# Patient Record
Sex: FEMALE | Race: WHITE | NOT HISPANIC OR LATINO | URBAN - NONMETROPOLITAN AREA
[De-identification: names, ages, dates, MRNs, and addresses within clinical notes are randomized per-mention and may not be internally consistent; named-entity substitution may affect disease eponyms.]

---

## 2019-02-20 ENCOUNTER — IMPORTED ENCOUNTER (OUTPATIENT)
Dept: URBAN - NONMETROPOLITAN AREA CLINIC 1 | Facility: CLINIC | Age: 69
End: 2019-02-20

## 2019-02-20 PROCEDURE — 92015 DETERMINE REFRACTIVE STATE: CPT

## 2019-02-20 PROCEDURE — 92014 COMPRE OPH EXAM EST PT 1/>: CPT

## 2019-02-20 NOTE — PATIENT DISCUSSION
Hyperopia / Astigmatism / Presbyopia OU- Discussed diagnosis in detail with patient- New glasses Rx given - Continue to monitorDES OU-  Discussed findings in detail w/ pt today-  Signs/symptoms associated dicussed-  Advised to continue ATs PRN-  Patient desired trial of Restasis in the past but did not use. Continues with Refresh OU BID PRN. -  Monitor PRN. Cataracts OU mild:- Discussed diagnosis in detail today- BAT done today was 20/400 and 20/200. - Mild VA complaint noted by the patient today- Defers any surgical intervention at this time. - Will continue to monitor for nowPVD OU- Discussed findings of exam in detail with the patient. - The risk of retinal detachment in patients with PVDs was discussed with the patient and the warning signs of retinal detachment were carefully reviewed with the patient. - The patient was warned to return to the office or contact the ophthalmologist on call immediately if they experience signs of retinal detachment or changes in vision noted from today. -  Continue to monitor. 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  - Patient is stable at this time - Continue to monitorDermatochalasis OU- Discussed diagnosis in detail with patient- No superior field of vision loss at this time- Continue to monitor; 's Notes: RM  10/24/16DFE  10/16/15OCT 10/16/15OPtos 10/24/16 - $29

## 2020-02-21 ENCOUNTER — IMPORTED ENCOUNTER (OUTPATIENT)
Dept: URBAN - NONMETROPOLITAN AREA CLINIC 1 | Facility: CLINIC | Age: 70
End: 2020-02-21

## 2020-02-21 PROBLEM — H16.223: Noted: 2020-02-21

## 2020-02-21 PROBLEM — H52.4: Noted: 2020-02-21

## 2020-02-21 PROCEDURE — 92015 DETERMINE REFRACTIVE STATE: CPT

## 2020-02-21 PROCEDURE — 92014 COMPRE OPH EXAM EST PT 1/>: CPT

## 2020-02-21 NOTE — PATIENT DISCUSSION
Hyperopia / Astigmatism / Presbyopia OU- Discussed diagnosis in detail with patient- New glasses Rx given today- Continue to monitorDES OU- Discussed diagnosis in detail today- Signs/symptoms associated dicussed- Advised to continue ATs PRN- Patient desired trial of Restasis in the past but did not use. Continues with Refresh OU BID PRN. - Continue to monitor Cataracts OU mild:- Discussed diagnosis in detail today- Discussed signs and symptoms of progression - Discussed UV protection- BAT done today was 20/200 and 20/100+ - Defers any surgical intervention at this time. - Continue to monitor PVD OU- Discussed findings of exam in detail with the patient. - The risk of retinal detachment in patients with PVDs was discussed with the patient and the warning signs of retinal detachment were carefully reviewed with the patient. - The patient was warned to return to the office or contact the ophthalmologist on call immediately if they experience signs of retinal detachment or changes in vision noted from today. -  Continue to monitor. Peripheral Drusen OD- Discussed diagnosis in detail with patient- Optos ordered of the mac previously appears stable - Patient is stable at this time- Continue to monitorOptic Nerve Drusen OU- Discussed diagnosis in detail with patient- Optos done previously appears stable.  - Patient is stable at this time - Continue to monitorDermatochalasis OU- Discussed diagnosis in detail with patient- No superior field of vision loss at this time- Patient complains that her lids droop- Will consider referral to Dr. Sean Lantigua when patient is ready- Continue to monitor; 's Notes: RM  10/24/16DFE  10/16/15OCT 10/16/15OPtos 10/24/16 - $29

## 2022-04-09 ASSESSMENT — VISUAL ACUITY
OS_SC: 20/25-
OD_GLARE: 20/400
OS_GLARE: 20/200
OS_SC: 20/30
OD_GLARE: 20/200
OD_SC: 20/25-
OS_GLARE: 20/100+
OD_SC: 20/25-2

## 2022-04-09 ASSESSMENT — TONOMETRY
OD_IOP_MMHG: 16
OS_IOP_MMHG: 15
OD_IOP_MMHG: 15
OS_IOP_MMHG: 16